# Patient Record
Sex: MALE | URBAN - METROPOLITAN AREA
[De-identification: names, ages, dates, MRNs, and addresses within clinical notes are randomized per-mention and may not be internally consistent; named-entity substitution may affect disease eponyms.]

---

## 2017-04-06 ENCOUNTER — IMPORTED ENCOUNTER (OUTPATIENT)
Dept: URBAN - METROPOLITAN AREA CLINIC 9 | Facility: CLINIC | Age: 82
End: 2017-04-06

## 2017-05-22 ENCOUNTER — IMPORTED ENCOUNTER (OUTPATIENT)
Dept: URBAN - METROPOLITAN AREA CLINIC 9 | Facility: CLINIC | Age: 82
End: 2017-05-22

## 2017-07-05 ENCOUNTER — IMPORTED ENCOUNTER (OUTPATIENT)
Dept: URBAN - METROPOLITAN AREA CLINIC 9 | Facility: CLINIC | Age: 82
End: 2017-07-05

## 2017-07-06 ENCOUNTER — IMPORTED ENCOUNTER (OUTPATIENT)
Dept: URBAN - METROPOLITAN AREA CLINIC 9 | Facility: CLINIC | Age: 82
End: 2017-07-06

## 2017-10-19 ENCOUNTER — IMPORTED ENCOUNTER (OUTPATIENT)
Dept: URBAN - METROPOLITAN AREA CLINIC 9 | Facility: CLINIC | Age: 82
End: 2017-10-19

## 2018-02-14 ENCOUNTER — IMPORTED ENCOUNTER (OUTPATIENT)
Dept: URBAN - METROPOLITAN AREA CLINIC 9 | Facility: CLINIC | Age: 83
End: 2018-02-14

## 2018-04-24 ENCOUNTER — IMPORTED ENCOUNTER (OUTPATIENT)
Dept: URBAN - METROPOLITAN AREA CLINIC 9 | Facility: CLINIC | Age: 83
End: 2018-04-24

## 2018-06-11 ENCOUNTER — IMPORTED ENCOUNTER (OUTPATIENT)
Dept: URBAN - METROPOLITAN AREA CLINIC 9 | Facility: CLINIC | Age: 83
End: 2018-06-11

## 2018-07-18 ENCOUNTER — IMPORTED ENCOUNTER (OUTPATIENT)
Dept: URBAN - METROPOLITAN AREA CLINIC 9 | Facility: CLINIC | Age: 83
End: 2018-07-18

## 2018-10-16 ENCOUNTER — IMPORTED ENCOUNTER (OUTPATIENT)
Dept: URBAN - METROPOLITAN AREA CLINIC 9 | Facility: CLINIC | Age: 83
End: 2018-10-16

## 2019-02-18 ENCOUNTER — IMPORTED ENCOUNTER (OUTPATIENT)
Dept: URBAN - METROPOLITAN AREA CLINIC 9 | Facility: CLINIC | Age: 84
End: 2019-02-18

## 2019-05-10 ENCOUNTER — IMPORTED ENCOUNTER (OUTPATIENT)
Dept: URBAN - METROPOLITAN AREA CLINIC 9 | Facility: CLINIC | Age: 84
End: 2019-05-10

## 2019-08-12 ENCOUNTER — IMPORTED ENCOUNTER (OUTPATIENT)
Dept: URBAN - METROPOLITAN AREA CLINIC 9 | Facility: CLINIC | Age: 84
End: 2019-08-12

## 2019-08-14 ENCOUNTER — IMPORTED ENCOUNTER (OUTPATIENT)
Dept: URBAN - METROPOLITAN AREA CLINIC 9 | Facility: CLINIC | Age: 84
End: 2019-08-14

## 2020-07-06 NOTE — PATIENT DISCUSSION
Indications, risks, benefits and alternatives to YAG capsulotomy discussed with patient. Questions answered. Educational handout given. YAG OD>OS.

## 2020-08-10 NOTE — PATIENT DISCUSSION
Patient wishes to hold off on YAG at this time. Instructed patient to call if notices a decline in vision.

## 2020-12-17 NOTE — PATIENT DISCUSSION
Patient tripped and fell in a parking lot and hit RUL/Brow area. Patient came straight to the office holding a cloth on wound to control bleeding, bleeding was well controlled in office.

## 2021-10-16 ASSESSMENT — TONOMETRY
OD_IOP_MMHG: 12
OS_IOP_MMHG: 8
OD_IOP_MMHG: 15
OD_IOP_MMHG: 5
OS_IOP_MMHG: 13
OS_IOP_MMHG: 8
OS_IOP_MMHG: 9
OD_IOP_MMHG: 12
OD_IOP_MMHG: 12
OS_IOP_MMHG: 8
OS_IOP_MMHG: 11
OD_IOP_MMHG: 6
OS_IOP_MMHG: 10
OS_IOP_MMHG: 15
OS_IOP_MMHG: 17
OS_IOP_MMHG: 10
OD_IOP_MMHG: 11
OD_IOP_MMHG: 10
OD_IOP_MMHG: 7
OD_IOP_MMHG: 8
OD_IOP_MMHG: 6
OS_IOP_MMHG: 11
OD_IOP_MMHG: 8
OS_IOP_MMHG: 15

## 2021-10-16 ASSESSMENT — KERATOMETRY
OS_AXISANGLE2_DEGREES: 88
OS_AXISANGLE2_DEGREES: 91
OS_K2POWER_DIOPTERS: 44
OS_AXISANGLE2_DEGREES: 86
OS_AXISANGLE_DEGREES: 176
OS_AXISANGLE_DEGREES: 1
OS_K1POWER_DIOPTERS: 41.75
OS_K2POWER_DIOPTERS: 43.75
OS_AXISANGLE_DEGREES: 178
OS_K2POWER_DIOPTERS: 44
OS_K1POWER_DIOPTERS: 42
OS_K1POWER_DIOPTERS: 41.5

## 2021-10-16 ASSESSMENT — VISUAL ACUITY
OS_CC: 20/25 - SN
OS_CC: 20/30 -2 SN
OS_CC: 20/40 - SN
OS_SC: 20/50 +2 SN
OS_SC: 20/50 +2 SN
OS_CC: 20/25 -2 SN
OS_CC: 20/25 -2 SN
OS_CC: 20/40 SN
OS_CC: 20/40 +2 SN
OS_SC: 20/60 SN
OS_SC: 20/30 - SN
OS_CC: 20/30 - SN
OS_CC: 20/30 -2 SN
OS_CC: 20/30 - SN
OS_CC: 20/30 -2 SN